# Patient Record
Sex: MALE | Race: WHITE | Employment: FULL TIME | URBAN - METROPOLITAN AREA
[De-identification: names, ages, dates, MRNs, and addresses within clinical notes are randomized per-mention and may not be internally consistent; named-entity substitution may affect disease eponyms.]

---

## 2017-07-12 ENCOUNTER — APPOINTMENT (OUTPATIENT)
Dept: GENERAL RADIOLOGY | Age: 67
End: 2017-07-12
Attending: NURSE PRACTITIONER

## 2017-07-12 ENCOUNTER — HOSPITAL ENCOUNTER (EMERGENCY)
Age: 67
Discharge: HOME | End: 2017-07-12
Attending: FAMILY MEDICINE

## 2017-07-12 VITALS
WEIGHT: 204 LBS | BODY MASS INDEX: 32.78 KG/M2 | SYSTOLIC BLOOD PRESSURE: 137 MMHG | DIASTOLIC BLOOD PRESSURE: 88 MMHG | TEMPERATURE: 97.7 F | HEART RATE: 79 BPM | RESPIRATION RATE: 16 BRPM | OXYGEN SATURATION: 96 % | HEIGHT: 66 IN

## 2017-07-12 DIAGNOSIS — S92.102A CLOSED NONDISPLACED FRACTURE OF LEFT TALUS, UNSPECIFIED FRACTURE MORPHOLOGY, INITIAL ENCOUNTER: Primary | ICD-10-CM
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
## 2017-07-12 NOTE — DISCHARGE INSTRUCTIONS
Broken Foot: Care Instructions  Your Care Instructions    A broken foot, or foot fracture, is a break in one or more of the bones in your foot. It may happen because of a sports injury, a fall, or other accident. A compound, or open, fracture occurs when a bone breaks through the skin. A break that does not poke through the skin is a closed fracture. Your treatment depends on the location and type of break in your foot. You may need a splint, a cast, or an orthopedic shoe. Certain kinds of injuries may need surgery at some time. Whatever your treatment, you can ease symptoms and help your foot heal with care at home. You may need 6 to 8 weeks or more to fully heal.  You heal best when you take good care of yourself. Eat a variety of healthy foods, and don't smoke. Follow-up care is a key part of your treatment and safety. Be sure to make and go to all appointments, and call your doctor if you are having problems. It's also a good idea to know your test results and keep a list of the medicines you take. How can you care for yourself at home? · Be safe with medicines. Take pain medicines exactly as directed. ¨ If the doctor gave you a prescription medicine for pain, take it as prescribed. ¨ If you are not taking a prescription pain medicine, ask your doctor if you can take an over-the-counter medicine. · Leave the splint on until your follow-up appointment. Do not put any weight on the injured foot. If you were given crutches, use them as directed. · Put ice or a cold pack on your foot for 10 to 20 minutes at a time. Try to do this every 1 to 2 hours for the next 3 days (when you are awake) or until the swelling goes down. Put a thin cloth between the ice and your skin. · Prop up the sore foot on a pillow anytime you sit or lie down during the next 3 days. Try to keep it above the level of your heart. This will help reduce swelling. · Follow the cast care instructions your doctor gives you.  If you have a splint, do not take it off unless your doctor tells you to. Cast and splint care  · If you have a removable splint, ask your doctor if it is okay to remove it to bathe. Your doctor may want you to keep it on as much as possible. · Keep your plaster splint covered by taping a sheet of plastic around it when you bathe. Water under the plaster can cause your skin to itch and hurt. · Never cut your splint. When should you call for help? Call 911 anytime you think you may need emergency care. For example, call if:  · You have sudden chest pain and shortness of breath, or you cough up blood. Call your doctor now or seek immediate medical care if:  · You have increased or severe pain. · Your toes are cool or pale or change color. · You have tingling, weakness, or numbness in your foot. · Your cast or splint feels too tight. · You cannot move your toes. · You have signs of a blood clot, such as:  ¨ Pain in your calf, back of the knee, thigh, or groin. ¨ Redness or swelling in your leg or groin. Watch closely for changes in your health, and be sure to contact your doctor if:  · Your pain is not better in 2 to 3 days. Where can you learn more? Go to http://nima-leroy.info/. Enter M612 in the search box to learn more about \"Broken Foot: Care Instructions. \"  Current as of: March 21, 2017  Content Version: 11.3  © 5868-2708 [employer]. Care instructions adapted under license by [employer] (which disclaims liability or warranty for this information). If you have questions about a medical condition or this instruction, always ask your healthcare professional. [place] [address] any warranty or liability for your use of this information.

## 2018-09-01 ENCOUNTER — OFFICE VISIT (OUTPATIENT)
Dept: URGENT CARE | Age: 68
End: 2018-09-01

## 2018-09-01 VITALS
HEART RATE: 97 BPM | WEIGHT: 211 LBS | BODY MASS INDEX: 33.91 KG/M2 | TEMPERATURE: 97.7 F | HEIGHT: 66 IN | RESPIRATION RATE: 18 BRPM | OXYGEN SATURATION: 97 % | DIASTOLIC BLOOD PRESSURE: 73 MMHG | SYSTOLIC BLOOD PRESSURE: 149 MMHG

## 2018-09-01 DIAGNOSIS — J06.9 VIRAL URI WITH COUGH: Primary | ICD-10-CM

## 2018-09-01 RX ORDER — CHOLECALCIFEROL TAB 125 MCG (5000 UNIT) 125 MCG
TAB ORAL DAILY
COMMUNITY

## 2018-09-01 RX ORDER — GUAIFENESIN 100 MG/5ML
81 LIQUID (ML) ORAL DAILY
COMMUNITY

## 2018-09-01 RX ORDER — AZITHROMYCIN 250 MG/1
TABLET, FILM COATED ORAL
Qty: 6 TAB | Refills: 0 | Status: SHIPPED | OUTPATIENT
Start: 2018-09-01 | End: 2018-09-06

## 2018-09-01 NOTE — MR AVS SNAPSHOT
[address] [place] [address] 
[phone] Patient: Belen Born MRN: [id number] Callie Acosta Visit Information Date & Time Provider Department Dept. Phone Encounter #  
 9/1/2018  1:15 PM [facility] [phone] [id number] Follow-up Instructions Return if symptoms worsen or fail to improve. Follow-up and Disposition History Upcoming Health Maintenance Date Due DTaP/Tdap/Td series (1 - Tdap) 8/15/1973 FOBT Q 1 YEAR AGE 50-75 8/15/2002 ZOSTER VACCINE AGE 60> 6/15/2012 GLAUCOMA SCREENING Q2Y 8/15/2017 Pneumococcal 65+ Low/Medium Risk (1 of 2 - PCV13) 8/15/2017 Influenza Age 5 to Adult 8/1/2018 Allergies as of 9/1/2018  Review Complete On: 9/1/2018 By: Paula Beard RN No Known Allergies Current Immunizations  Never Reviewed No immunizations on file. Not reviewed this visit You Were Diagnosed With   
  
 Codes Comments Viral URI with cough    -  Primary ICD-10-CM: J06.9, B97.89 ICD-9-CM: 465.9 Vitals BP Pulse Temp Resp Height(growth percentile) Weight(growth percentile) 149/73 97 97.7 °F (36.5 °C) 18 5' 6\" (1.676 m) 211 lb (95.7 kg) SpO2 BMI Smoking Status 97% 34.06 kg/m2 Current Some Day Smoker BMI and BSA Data Body Mass Index Body Surface Area 34.06 kg/m 2 2.11 m 2 Your Updated Medication List  
  
   
This list is accurate as of 9/1/18  2:09 PM.  Always use your most recent med list.  
  
  
  
  
 aspirin 81 mg chewable tablet Take 81 mg by mouth daily. azithromycin 250 mg tablet Commonly known as:  Astoria Ping Take 2 tablets today, then take 1 tablet daily  
  
 cholecalciferol (VITAMIN D3) 5,000 unit Tab tablet Commonly known as:  VITAMIN D3 Take  by mouth daily. LISINOPRIL-HYDROCHLOROTHIAZIDE PO Take  by mouth. Prescriptions Printed Refills azithromycin (ZITHROMAX) 250 mg tablet 0 Sig: Take 2 tablets today, then take 1 tablet daily Class: Print Follow-up Instructions Return if symptoms worsen or fail to improve. Patient Instructions Learning About COPD and Upper Respiratory Infections What are upper respiratory infections? An upper respiratory infection, also called a URI, is an infection of the nose, sinuses, or throat. Viruses or bacteria can cause URIs. Colds, the flu, and sinusitis are examples of URIs. These infections are spread by coughs, sneezes, and close contact. How do these infections affect COPD? A URI can worsen COPD symptoms, such as having too much mucus in your lungs, coughing, or being short of breath. What can you do to manage most infections at home? · Do not smoke. Avoid secondhand smoke. · Take an over-the-counter pain medicine, such as acetaminophen (Tylenol), ibuprofen (Advil, Motrin), or naproxen (Aleve). Read and follow all instructions on the label. · Be careful when taking over-the-counter cold or flu medicines and Tylenol at the same time. Many of these medicines have acetaminophen, which is Tylenol. Read the labels to make sure that you are not taking more than the recommended dose. Too much acetaminophen (Tylenol) can be harmful. · If your doctor prescribed antibiotics, take them as directed. Do not stop taking them just because you feel better. You need to take the full course of antibiotics. · Ask your doctor about cough medicines and decongestants. Some doctors recommend these medicines, while others feel that they do not help. · Learn breathing techniques for COPD, such as breathing through pursed lips. These techniques can help you breathe easier. What can you do to prevent these infections? Stay healthy · Do not smoke. This is the most important step you can take to prevent more damage to your lungs.  If you need help quitting, talk to your doctor about stop-smoking programs and medicines. These can increase your chances of quitting for good. · Avoid secondhand smoke, air pollution, and high altitudes. Also avoid cold, dry air and hot, humid air. Stay at home with your windows closed when air pollution is bad. · Get a flu shot every year. · Get a pneumococcal vaccine shot. If you have had one before, ask your doctor whether you need another dose. · If you must be around people with colds or the flu, wash your hands often. Exercise and eat well · If your doctor recommends it, get more exercise. Walking is a good choice. Bit by bit, increase the amount you walk every day. Try for at least 30 minutes on most days of the week. · Eat regular, well-balanced meals. Eating right keeps your energy levels up and helps your body fight infection. · Get plenty of rest and sleep. Follow-up care is a key part of your treatment and safety. Be sure to make and go to all appointments, and call your doctor if you are having problems. It's also a good idea to know your test results and keep a list of the medicines you take. Where can you learn more? Go to http://niam-leroy.info/. Enter T610 in the search box to learn more about \"Learning About COPD and Upper Respiratory Infections. \" Current as of: December 6, 2017 Content Version: 11.7 © 4724-4264 [employer]. Care instructions adapted under license by [employer] (which disclaims liability or warranty for this information). If you have questions about a medical condition or this instruction, always ask your healthcare professional. [place] [address] any warranty or liability for your use of this information. Introducing [facility]! [facility] introduces [employer] Online Ordering patient portal. Now you can access parts of your medical record, email your doctor's office, and request medication refills online.    
 
1. In your internet browser, go to [email or web address] 2. Click on the First Time User? Click Here link in the Sign In box. You will see the New Member Sign Up page. 3. Enter your [employer] Access Code exactly as it appears below. You will not need to use this code after youve completed the sign-up process. If you do not sign up before the expiration date, you must request a new code. · [employer] Access Code: [email or web address] Expires: 11/30/2018  2:09 PM 
 
4. Enter the last four digits of your Social Security Number (xxxx) and Date of Birth (mm/dd/yyyy) as indicated and click Submit. You will be taken to the next sign-up page. 5. Create a FunnelFiret ID. This will be your [employer] login ID and cannot be changed, so think of one that is secure and easy to remember. 6. Create a [employer] password. You can change your password at any time. 7. Enter your Password Reset Question and Answer. This can be used at a later time if you forget your password. 8. Enter your e-mail address. You will receive e-mail notification when new information is available in [address] 9. Click Sign Up. You can now view and download portions of your medical record. 10. Click the Download Summary menu link to download a portable copy of your medical information. If you have questions, please visit the Frequently Asked Questions section of the [employer] website. Remember, [employer] is NOT to be used for urgent needs. For medical emergencies, dial 911. Now available from your iPhone and Android! Please provide this summary of care documentation to your next provider. If you have any questions after today's visit, please call [phone].

## 2018-09-01 NOTE — PATIENT INSTRUCTIONS
Learning About COPD and Upper Respiratory Infections  What are upper respiratory infections? An upper respiratory infection, also called a URI, is an infection of the nose, sinuses, or throat. Viruses or bacteria can cause URIs. Colds, the flu, and sinusitis are examples of URIs. These infections are spread by coughs, sneezes, and close contact. How do these infections affect COPD? A URI can worsen COPD symptoms, such as having too much mucus in your lungs, coughing, or being short of breath. What can you do to manage most infections at home? · Do not smoke. Avoid secondhand smoke. · Take an over-the-counter pain medicine, such as acetaminophen (Tylenol), ibuprofen (Advil, Motrin), or naproxen (Aleve). Read and follow all instructions on the label. · Be careful when taking over-the-counter cold or flu medicines and Tylenol at the same time. Many of these medicines have acetaminophen, which is Tylenol. Read the labels to make sure that you are not taking more than the recommended dose. Too much acetaminophen (Tylenol) can be harmful. · If your doctor prescribed antibiotics, take them as directed. Do not stop taking them just because you feel better. You need to take the full course of antibiotics. · Ask your doctor about cough medicines and decongestants. Some doctors recommend these medicines, while others feel that they do not help. · Learn breathing techniques for COPD, such as breathing through pursed lips. These techniques can help you breathe easier. What can you do to prevent these infections? Stay healthy  · Do not smoke. This is the most important step you can take to prevent more damage to your lungs. If you need help quitting, talk to your doctor about stop-smoking programs and medicines. These can increase your chances of quitting for good. · Avoid secondhand smoke, air pollution, and high altitudes. Also avoid cold, dry air and hot, humid air.  Stay at home with your windows closed when air pollution is bad. · Get a flu shot every year. · Get a pneumococcal vaccine shot. If you have had one before, ask your doctor whether you need another dose. · If you must be around people with colds or the flu, wash your hands often. Exercise and eat well  · If your doctor recommends it, get more exercise. Walking is a good choice. Bit by bit, increase the amount you walk every day. Try for at least 30 minutes on most days of the week. · Eat regular, well-balanced meals. Eating right keeps your energy levels up and helps your body fight infection. · Get plenty of rest and sleep. Follow-up care is a key part of your treatment and safety. Be sure to make and go to all appointments, and call your doctor if you are having problems. It's also a good idea to know your test results and keep a list of the medicines you take. Where can you learn more? Go to http://nima-leroy.info/. Enter N755 in the search box to learn more about \"Learning About COPD and Upper Respiratory Infections. \"  Current as of: December 6, 2017  Content Version: 11.7  © 6051-3400 [employer]. Care instructions adapted under license by [employer] (which disclaims liability or warranty for this information). If you have questions about a medical condition or this instruction, always ask your healthcare professional. [address] any warranty or liability for your use of this information.

## 2018-09-01 NOTE — PROGRESS NOTES
HPI Comments:  Five days of hoarseness and a sore throat with a productive cough. No h/o lung illness. No DM. Has sleep apnea. Patient is a 79 y.o. male presenting with cold symptoms. The history is provided by the patient. Cold Symptoms   The current episode started more than 2 days ago. The problem has not changed since onset. Associated symptoms include sore throat. Pertinent negatives include no ear pain. Past Medical History:   Diagnosis Date    Hypertension     Sleep apnea         History reviewed. No pertinent surgical history. History reviewed. No pertinent family history. Social History     Social History    Marital status:      Spouse name: N/A    Number of children: N/A    Years of education: N/A     Occupational History    Not on file. Social History Main Topics    Smoking status: Current Some Day Smoker    Smokeless tobacco: Never Used    Alcohol use Not on file    Drug use: Not on file    Sexual activity: Not on file     Other Topics Concern    Not on file     Social History Narrative    No narrative on file                ALLERGIES: Review of patient's allergies indicates no known allergies. Review of Systems   Constitutional: Negative for fever. HENT: Positive for sore throat. Negative for ear pain, sinus pain and sinus pressure. Vitals:    09/01/18 1334   BP: 149/73   Pulse: 97   Resp: 18   Temp: 97.7 °F (36.5 °C)   SpO2: 97%   Weight: 211 lb (95.7 kg)   Height: 5' 6\" (1.676 m)       Physical Exam   Constitutional: He is oriented to person, place, and time. He appears well-developed and well-nourished. HENT:   Right Ear: External ear normal.   Left Ear: External ear normal.   Nose: Nose normal.   Mouth/Throat: Oropharynx is clear and moist.   Eyes: Conjunctivae and EOM are normal.   Cardiovascular: Normal rate, regular rhythm and normal heart sounds. Pulmonary/Chest: Effort normal. No respiratory distress. He has no wheezes.  He has no rales.   Lymphadenopathy:     He has no cervical adenopathy. Neurological: He is alert and oriented to person, place, and time. Skin: Skin is warm and dry. Psychiatric: He has a normal mood and affect. His behavior is normal. Thought content normal.   Nursing note and vitals reviewed. MDM     Differential Diagnosis; Clinical Impression; Plan:     Likely viral URI but will give antibiotic Rx to hold and will fill if no better after 48 hours with symptomatic care.    Progress:   Patient progress:  Stable      Procedures

## 2021-10-12 ENCOUNTER — OFFICE VISIT (OUTPATIENT)
Dept: URGENT CARE | Age: 71
End: 2021-10-12
Payer: MEDICARE

## 2021-10-12 VITALS — HEART RATE: 83 BPM | TEMPERATURE: 98.3 F | RESPIRATION RATE: 18 BRPM | OXYGEN SATURATION: 97 %

## 2021-10-12 DIAGNOSIS — Z20.822 SUSPECTED COVID-19 VIRUS INFECTION: Primary | ICD-10-CM

## 2021-10-12 LAB — SARS-COV-2 POC: NEGATIVE

## 2021-10-12 PROCEDURE — 87426 SARSCOV CORONAVIRUS AG IA: CPT | Performed by: FAMILY MEDICINE

## 2021-10-12 PROCEDURE — 99202 OFFICE O/P NEW SF 15 MIN: CPT | Performed by: FAMILY MEDICINE

## 2021-10-12 RX ORDER — TAMSULOSIN HYDROCHLORIDE 0.4 MG/1
0.4 CAPSULE ORAL DAILY
COMMUNITY
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
## 2023-01-05 ENCOUNTER — OFFICE VISIT (OUTPATIENT)
Dept: URGENT CARE | Age: 73
End: 2023-01-05
Payer: MEDICARE

## 2023-01-05 VITALS
RESPIRATION RATE: 19 BRPM | TEMPERATURE: 98.4 F | OXYGEN SATURATION: 98 % | SYSTOLIC BLOOD PRESSURE: 136 MMHG | WEIGHT: 201 LBS | DIASTOLIC BLOOD PRESSURE: 75 MMHG | HEIGHT: 66 IN | HEART RATE: 57 BPM | BODY MASS INDEX: 32.3 KG/M2

## 2023-01-05 DIAGNOSIS — J01.90 ACUTE SINUSITIS, RECURRENCE NOT SPECIFIED, UNSPECIFIED LOCATION: Primary | ICD-10-CM

## 2023-01-05 DIAGNOSIS — R05.1 ACUTE COUGH: ICD-10-CM

## 2023-01-05 RX ORDER — LISINOPRIL 20 MG/1
20 TABLET ORAL DAILY
COMMUNITY

## 2023-01-05 RX ORDER — DOXYCYCLINE 100 MG/1
100 TABLET ORAL 2 TIMES DAILY
Qty: 14 TABLET | Refills: 0 | Status: SHIPPED | OUTPATIENT
Start: 2023-01-05 | End: 2023-01-12

## 2023-01-05 RX ORDER — BENZONATATE 200 MG/1
200 CAPSULE ORAL
Qty: 21 CAPSULE | Refills: 0 | Status: SHIPPED | OUTPATIENT
Start: 2023-01-05 | End: 2023-01-12

## 2023-01-05 NOTE — PROGRESS NOTES
Subjective: (As above and below)     The patient/guardian gave verbal consent to treat. Chief Complaint   Patient presents with    Cold Symptoms     Cough cold congestion for 1 weeks      Mary Combs is a 81 y.o. male who presents for evaluation of : nasal congestion, cough. Symptom onset 1 week ago . Preceding illness: none. No other identified aggravating or alleviating factors. Symptoms are constant and overall worsening. Mucus now thick. Promotes no decrease in PO intake of fluids. Denies: severe lethargy, SOB, vomiting/diarrhea, fevers or chills. Known Exposure to COVID-19: no    ROS  Review of Systems - negative except as listed above    Reviewed PmHx, RxHx, FmHx, SocHx, AllgHx and updated in chart. Family History   Problem Relation Age of Onset    No Known Problems Mother     Psychiatric Disorder Father        Past Medical History:   Diagnosis Date    Hypertension     Sleep apnea       Social History     Socioeconomic History    Marital status:    Tobacco Use    Smoking status: Some Days    Smokeless tobacco: Never   Substance and Sexual Activity    Alcohol use: Yes     Comment: rare    Drug use: No   Social History Narrative    ** Merged History Encounter **               Current Outpatient Medications   Medication Sig    lisinopriL (PRINIVIL, ZESTRIL) 20 mg tablet Take 20 mg by mouth daily. doxycycline (ADOXA) 100 mg tablet Take 1 Tablet by mouth two (2) times a day for 7 days. benzonatate (TESSALON) 200 mg capsule Take 1 Capsule by mouth three (3) times daily as needed for Cough for up to 7 days. tamsulosin (Flomax) 0.4 mg capsule Take 0.4 mg by mouth daily. cholecalciferol, VITAMIN D3, (VITAMIN D3) 5,000 unit tab tablet Take  by mouth daily. No current facility-administered medications for this visit.        Objective:     Vitals:    01/05/23 1213   BP: 136/75   Pulse: (!) 57   Resp: 19   Temp: 98.4 °F (36.9 °C)   SpO2: 98%   Weight: 201 lb (91.2 kg)   Height: 5' 6\" (1.676 m)       Physical Exam  General appearance - appears well hydrated and does not appear toxic, no acute distress  Eyes - EOMs intact. Non injected. No scleral icterus   Ears - no external swelling. TMs normal bilat. Nose - nasal congestion; thick green mucus nasal passages L>R. No purulent drainage  Mouth - OP clear without swelling, exudate or lesion. Mucus membranes moist. Uvula midline. Neck/Lymphatics - trachea midline, full AROM, no LAD of neck  Chest - Normal breathing effort no wheeze rales, rhonchi or diminishments bilaterally. Heart - RRR, no murmurs  Skin - no observable rashes or pallor  Neurologic- alert and oriented x 3  Psychiatric- normal mood, behavior and though content. Assessment/ Plan:     1. Acute sinusitis, recurrence not specified, unspecified location    - doxycycline (ADOXA) 100 mg tablet; Take 1 Tablet by mouth two (2) times a day for 7 days. Dispense: 14 Tablet; Refill: 0    2. Acute cough    - benzonatate (TESSALON) 200 mg capsule; Take 1 Capsule by mouth three (3) times daily as needed for Cough for up to 7 days. Dispense: 21 Capsule; Refill: 0    Start doxycycline for sinus infection  Lungs clear; VS stable. no indication for CXR today  Nasal saline sprays  Tessalon for cough      Follow up: Follow up immediately for any new, worsening or changes or if symptoms are not improving over the next 5-7 days.          Darryl Gupta NP

## 2023-05-24 RX ORDER — LISINOPRIL 20 MG/1
20 TABLET ORAL DAILY
COMMUNITY

## 2023-05-24 RX ORDER — TAMSULOSIN HYDROCHLORIDE 0.4 MG/1
0.4 CAPSULE ORAL DAILY
COMMUNITY

## 2023-11-13 ENCOUNTER — OFFICE VISIT (OUTPATIENT)
Age: 73
End: 2023-11-13

## 2023-11-13 VITALS
OXYGEN SATURATION: 96 % | TEMPERATURE: 98.8 F | HEART RATE: 84 BPM | BODY MASS INDEX: 33.41 KG/M2 | WEIGHT: 207 LBS | DIASTOLIC BLOOD PRESSURE: 74 MMHG | SYSTOLIC BLOOD PRESSURE: 113 MMHG | RESPIRATION RATE: 17 BRPM

## 2023-11-13 DIAGNOSIS — J01.90 ACUTE NON-RECURRENT SINUSITIS, UNSPECIFIED LOCATION: Primary | ICD-10-CM

## 2023-11-13 LAB
Lab: NORMAL
QC PASS/FAIL: NORMAL
RSV, POC: NEGATIVE
SARS-COV-2 RDRP RESP QL NAA+PROBE: NEGATIVE
VALID INTERNAL CONTROL: YES

## 2023-11-13 RX ORDER — AMOXICILLIN AND CLAVULANATE POTASSIUM 875; 125 MG/1; MG/1
1 TABLET, FILM COATED ORAL 2 TIMES DAILY
Qty: 14 TABLET | Refills: 0 | Status: SHIPPED | OUTPATIENT
Start: 2023-11-13 | End: 2023-11-20

## 2023-11-13 ASSESSMENT — ENCOUNTER SYMPTOMS: COUGH: 1

## 2024-01-26 ENCOUNTER — OFFICE VISIT (OUTPATIENT)
Age: 74
End: 2024-01-26

## 2024-01-26 VITALS
SYSTOLIC BLOOD PRESSURE: 118 MMHG | TEMPERATURE: 99.4 F | RESPIRATION RATE: 16 BRPM | OXYGEN SATURATION: 98 % | BODY MASS INDEX: 32.86 KG/M2 | HEART RATE: 68 BPM | WEIGHT: 203.6 LBS | DIASTOLIC BLOOD PRESSURE: 81 MMHG

## 2024-01-26 DIAGNOSIS — R05.1 ACUTE COUGH: ICD-10-CM

## 2024-01-26 DIAGNOSIS — J06.9 VIRAL URI WITH COUGH: Primary | ICD-10-CM

## 2024-01-26 LAB
Lab: NORMAL
QC PASS/FAIL: NORMAL
SARS-COV-2 RDRP RESP QL NAA+PROBE: NEGATIVE

## 2024-01-26 RX ORDER — ALLOPURINOL 100 MG/1
TABLET ORAL
COMMUNITY

## 2024-01-26 RX ORDER — BENZONATATE 200 MG/1
200 CAPSULE ORAL 3 TIMES DAILY PRN
Qty: 21 CAPSULE | Refills: 0 | Status: SHIPPED | OUTPATIENT
Start: 2024-01-26 | End: 2024-02-02

## 2024-01-26 RX ORDER — ROSUVASTATIN CALCIUM 5 MG/1
5 TABLET, COATED ORAL
COMMUNITY

## 2024-01-26 RX ORDER — ALBUTEROL SULFATE 90 UG/1
2 AEROSOL, METERED RESPIRATORY (INHALATION) EVERY 4 HOURS PRN
Qty: 18 G | Refills: 0 | Status: SHIPPED | OUTPATIENT
Start: 2024-01-26

## 2024-01-26 NOTE — PROGRESS NOTES
Mick Rosas (:  1950) is a 73 y.o. male,Established patient, here for evaluation of the following chief complaint(s):  Nasal Congestion (Nasal congestion and cough x 3-4 days )      ASSESSMENT/PLAN:  Visit Diagnoses and Associated Orders       Viral URI with cough    -  Primary         Acute cough        POCT COVID-19 Rapid, NAAT [SOV278 Custom]      benzonatate (TESSALON) 200 MG capsule [87200]      albuterol sulfate HFA (VENTOLIN HFA) 108 (90 Base) MCG/ACT inhaler [43598]           ORDERS WITHOUT AN ASSOCIATED DIAGNOSIS    rosuvastatin (CRESTOR) 5 MG tablet [94793]      allopurinol (ZYLOPRIM) 100 MG tablet [310]      metroNIDAZOLE (METROCREAM) 0.75 % cream []            Negative COVID test today  Symptoms are consistent with a viral upper respiratory tract infection  Vital signs are stable, physical exam is benign, no evidence of bacterial infection today  Benzonatate can be taken up to 3x daily for cough  Albuterol inhaler can be used every 4-6 hours for cough/chest tightness  Hot tea with honey, saline sinus rinses, throat lozenges  Lots of fluids, plenty of rest  Follow up with PCP if symptoms persist or worsen  Go to ED if you develop any shortness of breath, chest pain or if you are unable to tolerate food or fluids      SUBJECTIVE/OBJECTIVE:  HPI     73 y.o. male presents with symptoms of cough and nasal congestion for the past 3 days. He denies fever, chills or body aches. No ear pain. Reports some stuffy, runny nose with yellow mucous production. Reports some mild sore throat. Coughing is dry and non-productive and he denies any shortness of breath, wheezing, or chest pain. No nausea, vomiting or diarrhea. Taking mucinex with some relief.         Vitals:    24 0932   BP: 118/81   Site: Left Upper Arm   Position: Sitting   Cuff Size: Large Adult   Pulse: 68   Resp: 16   Temp: 99.4 °F (37.4 °C)   TempSrc: Oral   SpO2: 98%   Weight: 92.4 kg (203 lb 9.6 oz)       Results for orders

## 2024-01-26 NOTE — PATIENT INSTRUCTIONS
Negative COVID test today  Symptoms are consistent with a viral upper respiratory tract infection  Vital signs are stable, physical exam is benign, no evidence of bacterial infection today  Benzonatate can be taken up to 3x daily for cough  Albuterol inhaler can be used every 4-6 hours for cough/chest tightness  Hot tea with honey, saline sinus rinses, throat lozenges  Lots of fluids, plenty of rest  Follow up with PCP if symptoms persist or worsen  Go to ED if you develop any shortness of breath, chest pain or if you are unable to tolerate food or fluids

## 2024-04-09 ENCOUNTER — OFFICE VISIT (OUTPATIENT)
Age: 74
End: 2024-04-09

## 2024-04-09 VITALS
OXYGEN SATURATION: 98 % | SYSTOLIC BLOOD PRESSURE: 136 MMHG | HEIGHT: 63 IN | BODY MASS INDEX: 35.54 KG/M2 | WEIGHT: 200.6 LBS | DIASTOLIC BLOOD PRESSURE: 89 MMHG | HEART RATE: 90 BPM | TEMPERATURE: 98.3 F

## 2024-04-09 DIAGNOSIS — J01.90 ACUTE NON-RECURRENT SINUSITIS, UNSPECIFIED LOCATION: Primary | ICD-10-CM

## 2024-04-09 RX ORDER — PREDNISONE 20 MG/1
20 TABLET ORAL 2 TIMES DAILY
Qty: 10 TABLET | Refills: 0 | Status: SHIPPED | OUTPATIENT
Start: 2024-04-09 | End: 2024-04-14

## 2024-04-09 RX ORDER — AMOXICILLIN AND CLAVULANATE POTASSIUM 875; 125 MG/1; MG/1
1 TABLET, FILM COATED ORAL 2 TIMES DAILY
Qty: 14 TABLET | Refills: 0 | Status: SHIPPED | OUTPATIENT
Start: 2024-04-09 | End: 2024-04-16

## 2024-04-09 RX ORDER — CHOLECALCIFEROL (VITAMIN D3) 125 MCG
500 CAPSULE ORAL DAILY
COMMUNITY

## 2024-04-09 ASSESSMENT — ENCOUNTER SYMPTOMS
HOARSE VOICE: 1
SINUS PAIN: 1
SINUS COMPLAINT: 1
SINUS PRESSURE: 1

## 2024-04-09 NOTE — PROGRESS NOTES
sinus tenderness present.      Left Sinus: Maxillary sinus tenderness present.      Mouth/Throat:      Pharynx: No oropharyngeal exudate or posterior oropharyngeal erythema.   Eyes:      Conjunctiva/sclera: Conjunctivae normal.   Cardiovascular:      Rate and Rhythm: Normal rate.   Pulmonary:      Effort: Pulmonary effort is normal. No respiratory distress.      Breath sounds: Normal breath sounds. No wheezing, rhonchi or rales.   Lymphadenopathy:      Cervical: No cervical adenopathy.   Skin:     Findings: No rash.   Neurological:      Mental Status: He is alert.          1. Acute non-recurrent sinusitis, unspecified location  -     amoxicillin-clavulanate (AUGMENTIN) 875-125 MG per tablet; Take 1 tablet by mouth 2 times daily for 7 days, Disp-14 tablet, R-0Normal  -     predniSONE (DELTASONE) 20 MG tablet; Take 1 tablet by mouth 2 times daily for 5 days, Disp-10 tablet, R-0Normal       Mucinex Fast max 2 tab 4 times/ day - day/ night pack   Or Dayquil/Nyquil    Stop zyrtec and change to  Allegra 180 mg daily   Saline sinus rinse     The patients condition was discussed with the patient and they understand.  The patient is to follow up with primary care doctor.  If signs and symptoms become worse the pt is to go to the ER. The patient is to take medications as prescribed.

## 2024-04-09 NOTE — PATIENT INSTRUCTIONS
Mucinex Fast max 2 tab 4 times/ day - day/ night pack   Or Dayquil/Nyquil    Stop zyrtec and change to  Allegra 180 mg daily   Saline sinus rinse

## 2025-05-16 ASSESSMENT — SLEEP AND FATIGUE QUESTIONNAIRES
HOW LIKELY ARE YOU TO NOD OFF OR FALL ASLEEP WHILE SITTING AND TALKING TO SOMEONE: WOULD NEVER DOZE
ESS TOTAL SCORE: 3
AVERAGE NUMBER OF SLEEP HOURS: 7
HOW LIKELY ARE YOU TO NOD OFF OR FALL ASLEEP WHILE WATCHING TV: SLIGHT CHANCE OF DOZING
SELECT ANY OF THE FOLLOWING BEHAVIORS OBSERVED WHILE YOU ARE ASLEEP: TWITCHING OF LEGS OR FEET
HAS YOUR RELATIONSHIP WITH FAMILY, FRIENDS OR WORK COLLEAGUES BEEN AFFECTED BECAUSE YOU ARE SLEEPY OR TIRED: NO
HOW LIKELY ARE YOU TO NOD OFF OR FALL ASLEEP WHILE SITTING AND TALKING TO SOMEONE: WOULD NEVER DOZE
DO YOU TAKE NAPS: NO
HOW LIKELY ARE YOU TO NOD OFF OR FALL ASLEEP WHILE SITTING INACTIVE IN A PUBLIC PLACE: WOULD NEVER DOZE
DO YOU GET TOO LITTLE SLEEP AT NIGHT: NO
SELECT ANY OF THE FOLLOWING BEHAVIORS OBSERVED WHILE YOU ARE ASLEEP: PAUSES IN BREATHING
NUMBER OF TIMES YOU WAKE PER NIGHT: 0
ARE YOU BOTHERED BY WAKING UP TOO EARLY AND NOT BEING ABLE TO GET BACK TO SLEEP: NO
DO YOU GET TOO LITTLE SLEEP AT NIGHT: NO
HOW LIKELY ARE YOU TO NOD OFF OR FALL ASLEEP WHEN YOU ARE A PASSENGER IN A CAR FOR AN HOUR WITHOUT A BREAK: WOULD NEVER DOZE
ARE YOU BOTHERED BY WAKING UP TOO EARLY AND NOT BEING ABLE TO GET BACK TO SLEEP: NO
DO YOU HAVE DIFFICULTY OPERATING A MOTOR VEHICLE FOR LONG DISTANCES (GREATER THAN 100 MILES) BECAUSE YOU BECOME SLEEPY: NO
DO YOU HAVE DIFFICULTY VISITING YOUR FAMILY OR FRIENDS IN THEIR HOME BECAUSE YOU BECOME SLEEPY OR TIRED: NO
AVERAGE NUMBER OF SLEEP HOURS: 7
HOW LIKELY ARE YOU TO NOD OFF OR FALL ASLEEP WHEN YOU ARE A PASSENGER IN A CAR FOR AN HOUR WITHOUT A BREAK: WOULD NEVER DOZE
HOW LIKELY ARE YOU TO NOD OFF OR FALL ASLEEP WHILE LYING DOWN TO REST IN THE AFTERNOON WHEN CIRCUMSTANCES PERMIT: MODERATE CHANCE OF DOZING
HOW LIKELY ARE YOU TO NOD OFF OR FALL ASLEEP WHILE SITTING AND READING: WOULD NEVER DOZE
HOW LIKELY ARE YOU TO NOD OFF OR FALL ASLEEP WHILE SITTING QUIETLY AFTER LUNCH WITHOUT ALCOHOL: WOULD NEVER DOZE
DO YOU GENERALLY HAVE DIFFICULTY REMEMBERING THINGS BECAUSE YOU ARE SLEEPY OR TIRED: NO
HAS YOUR MOOD BEEN AFFECTED BECAUSE YOU ARE SLEEPY OR TIRED: NO
DO YOU HAVE PROBLEMS WITH FREQUENT AWAKENINGS AT NIGHT: NO
DO YOU HAVE DIFFICULTY CONCENTRATING ON THE THINGS YOU DO BECAUSE YOU ARE SLEEPY OR TIRED: NO
HOW LIKELY ARE YOU TO NOD OFF OR FALL ASLEEP IN A CAR, WHILE STOPPED FOR A FEW MINUTES IN TRAFFIC: WOULD NEVER DOZE
HOW LIKELY ARE YOU TO NOD OFF OR FALL ASLEEP WHILE LYING DOWN TO REST IN THE AFTERNOON WHEN CIRCUMSTANCES PERMIT: MODERATE CHANCE OF DOZING
FOSQ SCORE: 20
HOW LIKELY ARE YOU TO NOD OFF OR FALL ASLEEP WHILE SITTING AND READING: WOULD NEVER DOZE
DO YOU HAVE DIFFICULTY BEING AS ACTIVE AS YOU WANT TO BE IN THE MORNING BECAUSE YOU ARE SLEEPY OR TIRED: NO
DO YOU HAVE DIFFICULTY OPERATING A MOTOR VEHICLE FOR SHORT DISTANCES (LESS THAN 100 MILES) BECAUSE YOU BECOME SLEEPY: NO
HOW LIKELY ARE YOU TO NOD OFF OR FALL ASLEEP WHILE WATCHING TV: SLIGHT CHANCE OF DOZING
HOW LIKELY ARE YOU TO NOD OFF OR FALL ASLEEP WHILE SITTING QUIETLY AFTER LUNCH WITHOUT ALCOHOL: WOULD NEVER DOZE
SELECT ANY OF THE FOLLOWING BEHAVIORS OBSERVED WHILE YOU ARE ASLEEP: HEAD ROCKING OR BANGING
HOW LIKELY ARE YOU TO NOD OFF OR FALL ASLEEP WHILE SITTING INACTIVE IN A PUBLIC PLACE: WOULD NEVER DOZE
DO YOU HAVE DIFFICULTY WATCHING A MOVIE OR VIDEO BECAUSE YOU BECOME SLEEPY OR TIRED: NO
HOW LIKELY ARE YOU TO NOD OFF OR FALL ASLEEP IN A CAR, WHILE STOPPED FOR A FEW MINUTES IN TRAFFIC: WOULD NEVER DOZE
DO YOU HAVE DIFFICULTY BEING AS ACTIVE AS YOU WANT TO BE IN THE EVENING BECAUSE YOU ARE SLEEPY OR TIRED: NO
WHAT TIME DO YOU USUALLY WAKE UP: 06:30
DO YOU WORK SHIFTS: NO

## 2025-05-19 ENCOUNTER — OFFICE VISIT (OUTPATIENT)
Age: 75
End: 2025-05-19
Payer: COMMERCIAL

## 2025-05-19 VITALS
OXYGEN SATURATION: 92 % | DIASTOLIC BLOOD PRESSURE: 72 MMHG | SYSTOLIC BLOOD PRESSURE: 113 MMHG | BODY MASS INDEX: 32.95 KG/M2 | HEIGHT: 66 IN | TEMPERATURE: 98.1 F | WEIGHT: 205 LBS

## 2025-05-19 DIAGNOSIS — G47.33 OBSTRUCTIVE SLEEP APNEA (ADULT) (PEDIATRIC): Primary | ICD-10-CM

## 2025-05-19 DIAGNOSIS — I10 PRIMARY HYPERTENSION: ICD-10-CM

## 2025-05-19 PROCEDURE — 3078F DIAST BP <80 MM HG: CPT | Performed by: INTERNAL MEDICINE

## 2025-05-19 PROCEDURE — 1123F ACP DISCUSS/DSCN MKR DOCD: CPT | Performed by: INTERNAL MEDICINE

## 2025-05-19 PROCEDURE — 3074F SYST BP LT 130 MM HG: CPT | Performed by: INTERNAL MEDICINE

## 2025-05-19 PROCEDURE — 99204 OFFICE O/P NEW MOD 45 MIN: CPT | Performed by: INTERNAL MEDICINE

## 2025-05-19 NOTE — PROGRESS NOTES
a car for an hour without a break 0   Lying down to rest in the afternoon when circumstances permit 2   Sitting and talking to someone 0   Sitting quietly after a lunch without alcohol 0   In a car, while stopped for a few minutes in traffic 0   [place]man Sleepiness Score 3    Neck (Inches) 18       Patient-reported                 Allergies   Allergen Reactions    Environmental/Seasonal Cough     Sneezing; watery eyes         Current Outpatient Medications:     vitamin B-12 (CYANOCOBALAMIN) 500 MCG tablet, Take 1 tablet by mouth daily, Disp: , Rfl:     rosuvastatin (CRESTOR) 5 MG tablet, Take 1 tablet by mouth, Disp: , Rfl:     allopurinol (ZYLOPRIM) 100 MG tablet, TAKE 2 TABLETS BY MOUTH EVERY DAY FOR 90 DAYS, Disp: , Rfl:     metroNIDAZOLE (METROCREAM) 0.75 % cream, APPLY 1 APPLICATION EXTERNALLY EVERY DAY FOR 90 DAYS, Disp: , Rfl:     lisinopril (PRINIVIL;ZESTRIL) 20 MG tablet, Take 1 tablet by mouth daily, Disp: , Rfl:     tamsulosin (FLOMAX) 0.4 MG capsule, Take 1 capsule by mouth daily, Disp: , Rfl:      He  has a past medical history of Hypertension and Sleep apnea.    He  has a past surgical history that includes hernia repair; Vasectomy; and pr unlisted procedure abdomen peritoneum & omentum.    He family history includes No Known Problems in his mother; Psychiatric Disorder in his father.    He  reports that he has never smoked. He has been exposed to tobacco smoke. He has never used smokeless tobacco. He reports current alcohol use of about 9.0 standard drinks of alcohol per week. He reports that he does not use drugs.     Review of Systems:  Constitutional:  mild weight loss since last seen  Eyes:  No blurred vision.  CVS:  No significant chest pain  Pulm:  No significant shortness of breath  GI:  No significant nausea or vomiting  :  No significant nocturia  Musculoskeletal:  No significant joint pain at night  Skin:  No significant rashes  Neuro:  No significant dizziness   Psych:  No active mood

## 2025-05-19 NOTE — PATIENT INSTRUCTIONS
[address]  [place] [address]  Tel.  [phone]  Fax. [phone] [address]  [place] [address]  Tel.  [phone]  Fax. [phone] [address] [place] [address]  Tel.  [phone]  Fax. [phone]     PROPER SLEEP HYGIENE    What to avoid  Do not have drinks with caffeine, such as coffee or black tea, for 8 hours before bed.  Do not smoke or use other types of tobacco near bedtime. Nicotine is a stimulant and can keep you awake.  Avoid drinking alcohol late in the evening, because it can cause you to wake in the middle of the night.  Do not eat a big meal close to bedtime. If you are hungry, eat a light snack.  Do not drink a lot of water close to bedtime, because the need to urinate may wake you up during the night.  Do not read or watch TV in bed. Use the bed only for sleeping and sexual activity.  What to try  Go to bed at the same time every night, and wake up at the same time every morning. Do not take naps during the day.  Keep your bedroom quiet, dark, and cool.  Get regular exercise, but not within 3 to 4 hours of your bedtime..  Sleep on a comfortable pillow and mattress.  If watching the clock makes you anxious, turn it facing away from you so you cannot see the time.  If you worry when you lie down, start a worry book. Well before bedtime, write down your worries, and then set the book and your concerns aside.  Try meditation or other relaxation techniques before you go to bed.  If you cannot fall asleep, get up and go to another room until you feel sleepy. Do something relaxing. Repeat your bedtime routine before you go to bed again.  Make your house quiet and calm about an hour before bedtime. Turn down the lights, turn off the TV, log off the computer, and turn down the volume on music. This can help you relax after a busy day.    Drowsy Driving  The [place] National Highway Traffic Safety Administration cites drowsiness as a

## 2025-06-17 ENCOUNTER — CLINICAL DOCUMENTATION (OUTPATIENT)
Age: 75
End: 2025-06-17

## 2025-07-15 ENCOUNTER — CLINICAL DOCUMENTATION (OUTPATIENT)
Age: 75
End: 2025-07-15

## 2025-08-11 ASSESSMENT — SLEEP AND FATIGUE QUESTIONNAIRES
HOW LIKELY ARE YOU TO NOD OFF OR FALL ASLEEP WHILE SITTING QUIETLY AFTER LUNCH WITHOUT ALCOHOL: WOULD NEVER DOZE
DO YOU HAVE DIFFICULTY OPERATING A MOTOR VEHICLE FOR LONG DISTANCES (GREATER THAN 100 MILES) BECAUSE YOU BECOME SLEEPY: NO
DO YOU HAVE DIFFICULTY WATCHING A MOVIE OR VIDEO BECAUSE YOU BECOME SLEEPY OR TIRED: NO
HOW LIKELY ARE YOU TO NOD OFF OR FALL ASLEEP WHILE SITTING AND READING: SLIGHT CHANCE OF DOZING
HAS YOUR RELATIONSHIP WITH FAMILY, FRIENDS OR WORK COLLEAGUES BEEN AFFECTED BECAUSE YOU ARE SLEEPY OR TIRED: NO
HOW LIKELY ARE YOU TO NOD OFF OR FALL ASLEEP WHILE SITTING QUIETLY AFTER LUNCH WITHOUT ALCOHOL: WOULD NEVER DOZE
HOW LIKELY ARE YOU TO NOD OFF OR FALL ASLEEP WHILE WATCHING TV: SLIGHT CHANCE OF DOZING
HOW LIKELY ARE YOU TO NOD OFF OR FALL ASLEEP WHEN YOU ARE A PASSENGER IN A CAR FOR AN HOUR WITHOUT A BREAK: WOULD NEVER DOZE
HOW LIKELY ARE YOU TO NOD OFF OR FALL ASLEEP WHILE SITTING INACTIVE IN A PUBLIC PLACE: WOULD NEVER DOZE
HOW LIKELY ARE YOU TO NOD OFF OR FALL ASLEEP WHILE SITTING INACTIVE IN A PUBLIC PLACE: WOULD NEVER DOZE
HOW LIKELY ARE YOU TO NOD OFF OR FALL ASLEEP IN A CAR, WHILE STOPPED FOR A FEW MINUTES IN TRAFFIC: WOULD NEVER DOZE
DO YOU GENERALLY HAVE DIFFICULTY REMEMBERING THINGS BECAUSE YOU ARE SLEEPY OR TIRED: NO
HOW LIKELY ARE YOU TO NOD OFF OR FALL ASLEEP WHILE SITTING AND TALKING TO SOMEONE: WOULD NEVER DOZE
DO YOU HAVE DIFFICULTY OPERATING A MOTOR VEHICLE FOR SHORT DISTANCES (LESS THAN 100 MILES) BECAUSE YOU BECOME SLEEPY: NO
FOSQ SCORE: 20
HOW LIKELY ARE YOU TO NOD OFF OR FALL ASLEEP WHILE LYING DOWN TO REST IN THE AFTERNOON WHEN CIRCUMSTANCES PERMIT: MODERATE CHANCE OF DOZING
HOW LIKELY ARE YOU TO NOD OFF OR FALL ASLEEP WHILE LYING DOWN TO REST IN THE AFTERNOON WHEN CIRCUMSTANCES PERMIT: MODERATE CHANCE OF DOZING
DO YOU HAVE DIFFICULTY BEING AS ACTIVE AS YOU WANT TO BE IN THE EVENING BECAUSE YOU ARE SLEEPY OR TIRED: NO
HOW LIKELY ARE YOU TO NOD OFF OR FALL ASLEEP IN A CAR, WHILE STOPPED FOR A FEW MINUTES IN TRAFFIC: WOULD NEVER DOZE
DO YOU HAVE DIFFICULTY CONCENTRATING ON THE THINGS YOU DO BECAUSE YOU ARE SLEEPY OR TIRED: NO
DO YOU HAVE DIFFICULTY BEING AS ACTIVE AS YOU WANT TO BE IN THE MORNING BECAUSE YOU ARE SLEEPY OR TIRED: NO
DO YOU HAVE DIFFICULTY VISITING YOUR FAMILY OR FRIENDS IN THEIR HOME BECAUSE YOU BECOME SLEEPY OR TIRED: NO
HOW LIKELY ARE YOU TO NOD OFF OR FALL ASLEEP WHILE SITTING AND READING: SLIGHT CHANCE OF DOZING
HAS YOUR MOOD BEEN AFFECTED BECAUSE YOU ARE SLEEPY OR TIRED: NO
HOW LIKELY ARE YOU TO NOD OFF OR FALL ASLEEP WHEN YOU ARE A PASSENGER IN A CAR FOR AN HOUR WITHOUT A BREAK: WOULD NEVER DOZE
HOW LIKELY ARE YOU TO NOD OFF OR FALL ASLEEP WHILE WATCHING TV: SLIGHT CHANCE OF DOZING
ESS TOTAL SCORE: 4
HOW LIKELY ARE YOU TO NOD OFF OR FALL ASLEEP WHILE SITTING AND TALKING TO SOMEONE: WOULD NEVER DOZE

## 2025-08-14 ENCOUNTER — CLINICAL DOCUMENTATION (OUTPATIENT)
Age: 75
End: 2025-08-14

## 2025-08-14 ENCOUNTER — TELEMEDICINE (OUTPATIENT)
Age: 75
End: 2025-08-14

## 2025-08-14 ENCOUNTER — TELEPHONE (OUTPATIENT)
Age: 75
End: 2025-08-14

## 2025-08-14 DIAGNOSIS — I10 PRIMARY HYPERTENSION: ICD-10-CM

## 2025-08-14 DIAGNOSIS — G47.33 OBSTRUCTIVE SLEEP APNEA (ADULT) (PEDIATRIC): Primary | ICD-10-CM
